# Patient Record
Sex: FEMALE | Race: WHITE | NOT HISPANIC OR LATINO | URBAN - METROPOLITAN AREA
[De-identification: names, ages, dates, MRNs, and addresses within clinical notes are randomized per-mention and may not be internally consistent; named-entity substitution may affect disease eponyms.]

---

## 2018-05-14 ENCOUNTER — EMERGENCY (EMERGENCY)
Facility: HOSPITAL | Age: 33
LOS: 1 days | Discharge: HOME | End: 2018-05-14
Attending: EMERGENCY MEDICINE | Admitting: EMERGENCY MEDICINE
Payer: COMMERCIAL

## 2018-05-14 VITALS
HEART RATE: 58 BPM | SYSTOLIC BLOOD PRESSURE: 102 MMHG | RESPIRATION RATE: 18 BRPM | DIASTOLIC BLOOD PRESSURE: 62 MMHG | TEMPERATURE: 98 F | OXYGEN SATURATION: 97 %

## 2018-05-14 VITALS
TEMPERATURE: 98 F | HEART RATE: 59 BPM | WEIGHT: 167.55 LBS | DIASTOLIC BLOOD PRESSURE: 59 MMHG | RESPIRATION RATE: 18 BRPM | OXYGEN SATURATION: 98 % | SYSTOLIC BLOOD PRESSURE: 102 MMHG | HEIGHT: 65 IN

## 2018-05-14 DIAGNOSIS — R07.89 OTHER CHEST PAIN: ICD-10-CM

## 2018-05-14 RX ORDER — ESCITALOPRAM OXALATE 10 MG/1
0 TABLET, FILM COATED ORAL
Qty: 0 | Refills: 0 | COMMUNITY

## 2018-05-14 NOTE — ED PROVIDER NOTE - OBJECTIVE STATEMENT
Pt w/ no sig PMHx p/w CP. The CP is L sided into throat, pressure-like. No associated SOB, diaphoresis, lightheadedness, palpitations, nausea, vomiting. Sx lasted approx 1 hour, w/o provocations or ameliorations, and have since resolved. Sx began while at rest at work. Pt reports hx anxiety / panic attacks and states she is stress b/c she is leaving on vacation. No recent travel / immobilization / surgery. no hx PE / DVT / CA. no calf pain or LE edema. No hemoptysis. No OCP use. No hx CAD. No sig FHx CAD or sudden death. Pt with [facility], had EKG, and was then sent to the ED for further evaluation. Never smoker. No drug use.

## 2018-05-14 NOTE — ED PROVIDER NOTE - MEDICAL DECISION MAKING DETAILS
Atypical CP, associated w/ anxiety. No EKG changes. No sig risk factors for ACS. Pain-free throughout ED visit w/o intervention. Low HEART / EDAC scores. No EKG findings of pericarditis, or pericardial effusion. No PE risk factors. PERC neg. Well's low risk. H&P not c/w dissection. CXR clear. D/c w/ f/u PCP. RTC worsening / recurrent sx

## 2018-05-14 NOTE — ED PROVIDER NOTE - PROGRESS NOTE DETAILS
pt remains pain-free. Very low risk cardiac disease. Sx no c/w PE, dissection. CXR clear. D/c w/ f/u PCP. RTC worsening sx.

## 2018-05-14 NOTE — ED ADULT NURSE NOTE - OBJECTIVE STATEMENT
pt received into spot 5 A&Ox3 ambulatory appears comfortable arrvies via walk in triage from [facility] for eval on CP. Pt reports while at work experienced an episode of sharp pain to left chest x 1 hr started while at work. Denies CP during evaluation and did not take meds for pain prior to arrival. Pt denies SOB palpitations lightheadedness dizziness weakness sweating fever chills recent travel leg swelling. Only daily med is lexpro for anxiety.Went to [facility] EKG NSR. 12 lead performed in ER also shows NSR. Respirations appear even and unlabored 20G placed to RAC labs drawn and sent pt in NAD informed and agreeable to plan will monitor and reassess

## 2020-05-08 NOTE — ED ADULT NURSE NOTE - ATTEMPT TO OOB
Pt wife is running errands. Call her if pt will be discharged.  Mobile number UTD in demographics.   no

## 2024-09-20 ENCOUNTER — NON-APPOINTMENT (OUTPATIENT)
Age: 39
End: 2024-09-20
